# Patient Record
Sex: MALE | Race: ASIAN | Employment: OTHER | URBAN - METROPOLITAN AREA
[De-identification: names, ages, dates, MRNs, and addresses within clinical notes are randomized per-mention and may not be internally consistent; named-entity substitution may affect disease eponyms.]

---

## 2022-09-02 ENCOUNTER — HOSPITAL ENCOUNTER (EMERGENCY)
Age: 67
Discharge: HOME | End: 2022-09-02
Attending: EMERGENCY MEDICINE
Payer: MEDICARE

## 2022-09-02 VITALS
HEART RATE: 65 BPM | OXYGEN SATURATION: 95 % | HEIGHT: 65 IN | WEIGHT: 153 LBS | DIASTOLIC BLOOD PRESSURE: 66 MMHG | SYSTOLIC BLOOD PRESSURE: 134 MMHG | BODY MASS INDEX: 25.49 KG/M2 | RESPIRATION RATE: 16 BRPM | TEMPERATURE: 98.4 F

## 2022-09-02 DIAGNOSIS — U07.1 COVID-19 VIRUS INFECTION: Primary | ICD-10-CM

## 2022-09-02 PROCEDURE — 99283 EMERGENCY DEPT VISIT LOW MDM: CPT

## 2022-09-02 RX ORDER — LOVASTATIN 40 MG/1
40 TABLET ORAL
COMMUNITY

## 2022-09-02 RX ORDER — NIRMATRELVIR AND RITONAVIR 150-100 MG
2 KIT ORAL EVERY 12 HOURS
Qty: 1 BOX | Refills: 0 | Status: SHIPPED | OUTPATIENT
Start: 2022-09-02 | End: 2022-09-07

## 2022-09-02 RX ORDER — AMLODIPINE BESYLATE 5 MG/1
5 TABLET ORAL DAILY
COMMUNITY

## 2022-09-02 RX ORDER — ACETAMINOPHEN 325 MG/1
1000 TABLET ORAL
Qty: 20 TABLET | Refills: 0 | Status: SHIPPED | OUTPATIENT
Start: 2022-09-02

## 2022-09-02 RX ORDER — PANTOPRAZOLE SODIUM 40 MG/1
40 TABLET, DELAYED RELEASE ORAL DAILY
COMMUNITY

## 2022-09-02 RX ORDER — GUAIFENESIN 1200 MG/1
1200 TABLET, EXTENDED RELEASE ORAL 2 TIMES DAILY
Qty: 10 TABLET | Refills: 0 | Status: SHIPPED | OUTPATIENT
Start: 2022-09-02

## 2022-09-02 RX ORDER — LOSARTAN POTASSIUM AND HYDROCHLOROTHIAZIDE 12.5; 5 MG/1; MG/1
1 TABLET ORAL DAILY
COMMUNITY

## 2022-09-02 RX ORDER — METFORMIN HYDROCHLORIDE 500 MG/1
500 TABLET ORAL 2 TIMES DAILY WITH MEALS
COMMUNITY

## 2022-09-02 NOTE — ED PROVIDER NOTES
66M w/ hx DM, HTN, HLD p/w 2d cough and body aches. Pt reports 2d cough, body aches, fatigue, joint pain. Also reports subjective F/C, sore throat w/ congestion. No dizziness, dyspnea, syncope or N/V/D. Wife is sick w/ same symptoms. Pt is vaccinated for COVID19 x3. Wife tested positive for COVID today and is a pt in ED. No hx of smoking. Past Medical History:   Diagnosis Date    Diabetes ([facility] [address]     type 2    Hyperlipidemia     Hypertension        History reviewed. No pertinent surgical history. History reviewed. No pertinent family history. Social History     Socioeconomic History    Marital status:      Spouse name: Not on file    Number of children: Not on file    Years of education: Not on file    Highest education level: Not on file   Occupational History    Not on file   Tobacco Use    Smoking status: Never     Passive exposure: Never    Smokeless tobacco: Never   Vaping Use    Vaping Use: Never used   Substance and Sexual Activity    Alcohol use: Never    Drug use: Never    Sexual activity: Not on file   Other Topics Concern    Not on file   Social History Narrative    Not on file     Social Determinants of Health     Financial Resource Strain: Not on file   Food Insecurity: Not on file   Transportation Needs: Not on file   Physical Activity: Not on file   Stress: Not on file   Social Connections: Not on file   Intimate Partner Violence: Not on file   Housing Stability: Not on file         ALLERGIES: Patient has no known allergies. Review of Systems   Constitutional:  Positive for chills, fatigue and fever. Negative for diaphoresis. HENT:  Positive for congestion and rhinorrhea. Negative for facial swelling, mouth sores, nosebleeds, trouble swallowing and voice change. Eyes:  Negative for pain and visual disturbance. Respiratory:  Positive for cough. Negative for apnea, shortness of breath, wheezing and stridor.     Cardiovascular:  Negative for chest pain, palpitations and leg swelling. Gastrointestinal:  Negative for abdominal distention, abdominal pain, blood in stool, diarrhea, nausea and vomiting. Genitourinary:  Negative for difficulty urinating, dysuria, flank pain, hematuria, scrotal swelling and testicular pain. Musculoskeletal:  Positive for arthralgias and myalgias. Negative for joint swelling. Skin:  Negative for color change and rash. Allergic/Immunologic: Negative for immunocompromised state. Neurological:  Negative for dizziness, syncope and light-headedness. Hematological:  Does not bruise/bleed easily. Psychiatric/Behavioral:  Negative for agitation and behavioral problems. Vitals:    09/02/22 1440   BP: 134/66   Pulse: 65   Resp: 16   Temp: 98.4 °F (36.9 °C)   SpO2: 95%   Weight: 69.4 kg (153 lb)   Height: 5' 5\" (1.651 m)            Physical Exam  Vitals and nursing note reviewed. Constitutional:       General: He is not in acute distress. Appearance: Normal appearance. He is not ill-appearing or toxic-appearing. HENT:      Head: Normocephalic and atraumatic. Right Ear: External ear normal.      Left Ear: External ear normal.      Nose: Nose normal.      Mouth/Throat:      Mouth: Mucous membranes are moist.      Pharynx: Oropharynx is clear. No oropharyngeal exudate or posterior oropharyngeal erythema. Eyes:      General: No scleral icterus. Extraocular Movements: Extraocular movements intact. Conjunctiva/sclera: Conjunctivae normal.      Pupils: Pupils are equal, round, and reactive to light. Cardiovascular:      Rate and Rhythm: Normal rate and regular rhythm. Pulses: Normal pulses. Heart sounds: No murmur heard. No friction rub. No gallop. Pulmonary:      Effort: Pulmonary effort is normal. No respiratory distress. Breath sounds: Normal breath sounds. No stridor. No wheezing, rhonchi or rales. Abdominal:      General: Bowel sounds are normal. There is no distension. Palpations: Abdomen is soft. Tenderness: There is no abdominal tenderness. There is no guarding or rebound. Musculoskeletal:         General: No deformity. Normal range of motion. Cervical back: Normal range of motion and neck supple. No rigidity. Right lower leg: No edema. Left lower leg: No edema. Skin:     General: Skin is warm. Capillary Refill: Capillary refill takes less than 2 seconds. Coloration: Skin is not jaundiced. Neurological:      General: No focal deficit present. Mental Status: He is alert. Cranial Nerves: No cranial nerve deficit. Sensory: No sensory deficit. Motor: No weakness. Coordination: Coordination normal.   Psychiatric:         Mood and Affect: Mood normal.         Behavior: Behavior normal.         Thought Content: Thought content normal.         Judgment: Judgment normal.        MDM  Number of Diagnoses or Management Options  COVID-19 virus infection  Diagnosis management comments: 66M w/ hx DM, HTN, HLD p/w 2d cough and body aches. Pt well appearing, hemodynamically stable, afebrile, no resp distress, hypoxia or increased WOB. Wife tested positive for COVID+ today. Low concern for sepsis or serious bacterial infection. Pt declined any further testing including labs, cxr, ekg, etc. Agreed to receive paxlovid x5d and scripts for tylenol and mucinex. This patient was seen during the [place] 19 Pandemic. I suspect that the patient has COVID 19 infection of mild severity based on HPI and physical exam. They are in no need of supplemental oxygenation and do not require further inpatient evaluation/admission at this time. Patient is aware of return precautions which include severe shortness of breath, chest pain, high fevers or any worsening of symptoms, detailed instructions regarding s/s of COVID 19 instructions included in the dc papers as well.  Patient is aware that they must follow-up with the primary care physician ideally via a virtual visit or return to the emergency department only if his symptoms worsen. Isolation precautions and hand hygeine discussed at length. At the time of d/c patient is well appearing, not sickly, toxic, ambulatory without respiratory distress, hypotension or hypoxia.      Risk of Complications, Morbidity, and/or Mortality  Presenting problems: moderate  Diagnostic procedures: moderate  Management options: moderate           Procedures

## 2022-09-02 NOTE — ED NOTES
Pain assessment on discharge was   Condition Stable  Patient discharged to home  Patient education was completed  Education taught to patient  Teaching method used was handout and verbal  Understanding of teaching was good  Patient was discharged ambulatory  Discharged with family  Valuables were given to:  pt remained in possession of belongings

## 2022-09-02 NOTE — ED TRIAGE NOTES
Patient arrives with c/o fever, cough, generalized body aches, headache and known COVID positive since this morning.

## 2022-09-06 ENCOUNTER — PATIENT OUTREACH (OUTPATIENT)
Dept: CASE MANAGEMENT | Age: 67
End: 2022-09-06

## 2022-09-06 NOTE — PROGRESS NOTES
Patient contacted regarding COVID-19 exposure, diagnosis. Discussed COVID-19 related testing which was available at this time. Test results were positive. Patient informed of results, if available? Tested at home. LPN Care Coordinator contacted the patient by telephone to perform post discharge assessment. Call within 2 business days of discharge: Yes Verified name and  with patient as identifiers. Provided introduction to self, and explanation of the CTN/ACM role, and reason for call due to risk factors for infection and/or exposure to COVID-19. Symptoms reviewed with patient who verbalized the following symptoms: pain or aching joints and cough      Due to no new or worsening symptoms encounter was not routed to provider for escalation. Discussed follow-up appointments. If no appointment was previously scheduled, appointment scheduling offered:  no. [facility] follow up appointment(s): No future appointments. Non-[facility] follow up appointment(s): n/a    Interventions to address risk factors: Education of patient/family/caregiver/guardian to support self-management-VDH and covid numbers given     Advance Care Planning:   Does patient have an Advance Directive: not on file. Educated patient about risk for severe COVID-19 due to risk factors according to CDC guidelines. LPN CC reviewed discharge instructions, medical action plan and red flag symptoms with the patient who verbalized understanding. Discussed COVID vaccination status: yes. Education provided on COVID-19 vaccination as appropriate. Discussed exposure protocols and quarantine with CDC Guidelines. Patient was given an opportunity to verbalize any questions and concerns and agrees to contact LPN CC or health care provider for questions related to their healthcare. Reviewed and educated patient on any new and changed medications related to discharge diagnosis     Was patient discharged with a pulse oximeter?  no    LPN CC provided contact information. Plan for follow-up call in 5-7 days based on severity of symptoms and risk factors.

## 2022-09-13 ENCOUNTER — PATIENT OUTREACH (OUTPATIENT)
Dept: CASE MANAGEMENT | Age: 67
End: 2022-09-13

## 2022-09-13 NOTE — PROGRESS NOTES
Patient resolved from [address] Transitions episode on 09/13/22 . Discussed COVID-19 related testing which was not done at this time. Test results were not done. Patient informed of results, if available? Home test positive. Patient/family has been provided the following resources and education related to COVID-19:                         Signs, symptoms and red flags related to COVID-19            CDC exposure and quarantine guidelines            [facility] exposure contact - [phone]            Contact for their local Department of Health                 Patient currently reports that the following symptoms have improved:  no worsening symptoms. No further outreach scheduled with this CTN/ACM/LPN/HC/ MA. Episode of Care resolved. Patient has this CTN/ACM/LPN/HC/MA contact information if future needs arise.

## 2024-08-20 ENCOUNTER — ANESTHESIA (OUTPATIENT)
Facility: HOSPITAL | Age: 69
End: 2024-08-20
Payer: MEDICARE

## 2024-08-20 ENCOUNTER — ANESTHESIA EVENT (OUTPATIENT)
Facility: HOSPITAL | Age: 69
End: 2024-08-20
Payer: MEDICARE

## 2024-08-20 ENCOUNTER — HOSPITAL ENCOUNTER (OUTPATIENT)
Facility: HOSPITAL | Age: 69
Setting detail: OUTPATIENT SURGERY
Discharge: HOME | End: 2024-08-20
Attending: INTERNAL MEDICINE | Admitting: INTERNAL MEDICINE
Payer: MEDICARE

## 2024-08-20 VITALS
OXYGEN SATURATION: 99 % | RESPIRATION RATE: 13 BRPM | HEART RATE: 46 BPM | SYSTOLIC BLOOD PRESSURE: 120 MMHG | DIASTOLIC BLOOD PRESSURE: 60 MMHG | TEMPERATURE: 97.2 F

## 2024-08-20 PROCEDURE — 3600007512: Performed by: INTERNAL MEDICINE

## 2024-08-20 PROCEDURE — 6360000002 HC RX W HCPCS: Performed by: NURSE ANESTHETIST, CERTIFIED REGISTERED

## 2024-08-20 PROCEDURE — 2720000010 HC SURG SUPPLY STERILE: Performed by: INTERNAL MEDICINE

## 2024-08-20 PROCEDURE — 3700000000 HC ANESTHESIA ATTENDED CARE: Performed by: INTERNAL MEDICINE

## 2024-08-20 PROCEDURE — 2709999900 HC NON-CHARGEABLE SUPPLY: Performed by: INTERNAL MEDICINE

## 2024-08-20 PROCEDURE — 2500000003 HC RX 250 WO HCPCS: Performed by: NURSE ANESTHETIST, CERTIFIED REGISTERED

## 2024-08-20 PROCEDURE — 7100000011 HC PHASE II RECOVERY - ADDTL 15 MIN: Performed by: INTERNAL MEDICINE

## 2024-08-20 PROCEDURE — 7100000010 HC PHASE II RECOVERY - FIRST 15 MIN: Performed by: INTERNAL MEDICINE

## 2024-08-20 PROCEDURE — 88305 TISSUE EXAM BY PATHOLOGIST: CPT

## 2024-08-20 PROCEDURE — 3700000001 HC ADD 15 MINUTES (ANESTHESIA): Performed by: INTERNAL MEDICINE

## 2024-08-20 PROCEDURE — 3600007502: Performed by: INTERNAL MEDICINE

## 2024-08-20 PROCEDURE — 2580000003 HC RX 258: Performed by: NURSE ANESTHETIST, CERTIFIED REGISTERED

## 2024-08-20 RX ORDER — SODIUM CHLORIDE 0.9 % (FLUSH) 0.9 %
5-40 SYRINGE (ML) INJECTION EVERY 12 HOURS SCHEDULED
Status: DISCONTINUED | OUTPATIENT
Start: 2024-08-20 | End: 2024-08-20 | Stop reason: HOSPADM

## 2024-08-20 RX ORDER — SODIUM CHLORIDE 0.9 % (FLUSH) 0.9 %
5-40 SYRINGE (ML) INJECTION PRN
Status: DISCONTINUED | OUTPATIENT
Start: 2024-08-20 | End: 2024-08-20 | Stop reason: HOSPADM

## 2024-08-20 RX ORDER — SODIUM CHLORIDE 9 MG/ML
25 INJECTION, SOLUTION INTRAVENOUS PRN
Status: DISCONTINUED | OUTPATIENT
Start: 2024-08-20 | End: 2024-08-20 | Stop reason: HOSPADM

## 2024-08-20 RX ORDER — SODIUM CHLORIDE 9 MG/ML
INJECTION, SOLUTION INTRAVENOUS CONTINUOUS PRN
Status: DISCONTINUED | OUTPATIENT
Start: 2024-08-20 | End: 2024-08-20 | Stop reason: SDUPTHER

## 2024-08-20 RX ORDER — SODIUM CHLORIDE 9 MG/ML
INJECTION, SOLUTION INTRAVENOUS CONTINUOUS
Status: DISCONTINUED | OUTPATIENT
Start: 2024-08-20 | End: 2024-08-20 | Stop reason: HOSPADM

## 2024-08-20 RX ORDER — LIDOCAINE HYDROCHLORIDE 20 MG/ML
INJECTION, SOLUTION EPIDURAL; INFILTRATION; INTRACAUDAL; PERINEURAL PRN
Status: DISCONTINUED | OUTPATIENT
Start: 2024-08-20 | End: 2024-08-20 | Stop reason: SDUPTHER

## 2024-08-20 RX ADMIN — PROPOFOL 25 MG: 10 INJECTION, EMULSION INTRAVENOUS at 13:12

## 2024-08-20 RX ADMIN — LIDOCAINE HYDROCHLORIDE 50 MG: 20 INJECTION, SOLUTION EPIDURAL; INFILTRATION; INTRACAUDAL; PERINEURAL at 13:06

## 2024-08-20 RX ADMIN — PROPOFOL 50 MG: 10 INJECTION, EMULSION INTRAVENOUS at 13:17

## 2024-08-20 RX ADMIN — PROPOFOL 50 MG: 10 INJECTION, EMULSION INTRAVENOUS at 13:19

## 2024-08-20 RX ADMIN — PROPOFOL 50 MG: 10 INJECTION, EMULSION INTRAVENOUS at 13:22

## 2024-08-20 RX ADMIN — PROPOFOL 25 MG: 10 INJECTION, EMULSION INTRAVENOUS at 13:10

## 2024-08-20 RX ADMIN — PROPOFOL 50 MG: 10 INJECTION, EMULSION INTRAVENOUS at 13:14

## 2024-08-20 RX ADMIN — PROPOFOL 25 MG: 10 INJECTION, EMULSION INTRAVENOUS at 13:25

## 2024-08-20 RX ADMIN — SODIUM CHLORIDE: 9 INJECTION, SOLUTION INTRAVENOUS at 12:33

## 2024-08-20 RX ADMIN — PROPOFOL 75 MG: 10 INJECTION, EMULSION INTRAVENOUS at 13:06

## 2024-08-20 RX ADMIN — PROPOFOL 25 MG: 10 INJECTION, EMULSION INTRAVENOUS at 13:08

## 2024-08-20 NOTE — PROGRESS NOTES
Initial RN admission and assessment performed and documented in Endoscopy navigator.     Patient evaluated by anesthesia in pre-procedure holding.     All procedural vital signs, airway assessment, and level of consciousness information monitored and recorded by anesthesia staff on the anesthesia record.     Report received from CRNA post procedure.  Patient transported to recovery area by RN.    Endoscopy post procedure time out was performed and specimens were verified with physician.    Endoscope was pre-cleaned at bedside immediately following procedure by Jose Miguel ARMSTRONG

## 2024-08-20 NOTE — ANESTHESIA PRE PROCEDURE
Bong JUAREZ MD       • 0.9 % sodium chloride infusion  25 mL IntraVENous PRN Bong Boswell MD         Facility-Administered Medications Ordered in Other Encounters   Medication Dose Route Frequency Provider Last Rate Last Admin   • 0.9 % sodium chloride infusion   IntraVENous Continuous PRN Mela Salguero APRN - CRNA   New Bag at 08/20/24 1233       Allergies:  No Known Allergies    Problem List:  There is no problem list on file for this patient.      Past Medical History:        Diagnosis Date   • Diabetes (HCC)     type 2   • Hyperlipidemia    • Hypertension    • Urolithiasis        Past Surgical History:  History reviewed. No pertinent surgical history.    Social History:    Social History     Tobacco Use   • Smoking status: Never   • Smokeless tobacco: Never   Substance Use Topics   • Alcohol use: Never                                Counseling given: Not Answered      Vital Signs (Current):   Vitals:    08/20/24 1220   BP: (!) 155/66   Pulse: 55   Resp: 20   Temp: 97.6 °F (36.4 °C)   TempSrc: Tympanic   SpO2: 96%                                              BP Readings from Last 3 Encounters:   08/20/24 (!) 155/66   12/20/23 (!) 141/66       NPO Status:                                                                                 BMI:   Wt Readings from Last 3 Encounters:   12/20/23 68 kg (150 lb)     There is no height or weight on file to calculate BMI.    CBC:   Lab Results   Component Value Date/Time    WBC 5.2 12/20/2023 11:59 AM    RBC 4.86 12/20/2023 11:59 AM    HGB 14.3 12/20/2023 11:59 AM    HCT 42.2 12/20/2023 11:59 AM    MCV 86.8 12/20/2023 11:59 AM    RDW 12.1 12/20/2023 11:59 AM     12/20/2023 11:59 AM       CMP:   Lab Results   Component Value Date/Time     12/20/2023 11:59 AM    K 3.6 12/20/2023 11:59 AM     12/20/2023 11:59 AM    CO2 27 12/20/2023 11:59 AM    BUN 16 12/20/2023 11:59 AM    CREATININE 0.62 12/20/2023 11:59 AM    LABGLOM >60 12/20/2023 11:59 AM

## 2024-08-20 NOTE — ANESTHESIA POSTPROCEDURE EVALUATION
Post-Anesthesia Evaluation and Assessment    Patient: Luis Rocha MRN: [id number]  SSN: [id number]    YOB: 1955  Age: 68 y.o.  Sex: male      I have evaluated the patient and they are stable and ready for discharge from the PACU.     Cardiovascular Function/Vital Signs  Visit Vitals  /60   Pulse (!) 46   Temp 97.2 °F (36.2 °C) (Temporal)   Resp 13   SpO2 99%       Patient is status post Monitor Anesthesia Care anesthesia for Procedure(s):  ESOPHAGOGASTRODUODENOSCOPY  COLONOSCOPY.    Nausea/Vomiting: None    Postoperative hydration reviewed and adequate.    Pain:      Managed    Neurological Status:       At baseline    Mental Status, Level of Consciousness: Alert and  oriented to person, place, and time    Pulmonary Status:       Adequate oxygenation and airway patent    Complications related to anesthesia: None    Post-anesthesia assessment completed. No concerns    Signed By: Evens Montes De Oca MD     August 20, 2024            Post-Anesthesia Evaluation and Assessment    Patient: Luis Rocha MRN: [id number]  SSN: [id number]    YOB: 1955  Age: 68 y.o.  Sex: male      I have evaluated the patient and they are stable and ready for discharge from the PACU.     Cardiovascular Function/Vital Signs  Visit Vitals  /60   Pulse (!) 46   Temp 97.2 °F (36.2 °C) (Temporal)   Resp 13   SpO2 99%       Patient is status post Monitor Anesthesia Care anesthesia for Procedure(s):  ESOPHAGOGASTRODUODENOSCOPY  COLONOSCOPY.    Nausea/Vomiting: None    Postoperative hydration reviewed and adequate.    Pain:      Managed    Neurological Status:       At baseline    Mental Status, Level of Consciousness: Alert and  oriented to person, place, and time    Pulmonary Status:       Adequate oxygenation and airway patent    Complications related to anesthesia: None    Post-anesthesia assessment completed. No concerns    Signed By: Evens Montes De Oca MD     August 20, 2024            Department of

## 2024-08-20 NOTE — DISCHARGE INSTRUCTIONS
[facility] [address] [address]    EGD DISCHARGE INSTRUCTIONS    Luis Rocha  [id number]  1955    Discomfort:  Sore throat- throat lozenges or warm salt water gargle  redness at IV site- apply warm compress to area; if redness or soreness persist- contact your physician  Gaseous discomfort- walking, belching will help relieve any discomfort  You may not operate a vehicle for 12 hours  You may not engage in an occupation involving machinery or appliances for rest of today  You may not drink alcoholic beverages for at least 12 hours  Avoid making any critical decisions for at least 24 hour  DIET  You may resume your regular diet - however -  remember your colon is empty and a heavy meal will produce gas.   Avoid these foods:  vegetables, fried / greasy foods, carbonated drinks    ACTIVITY  You may resume your normal daily activities   Spend the remainder of the day resting -  avoid any strenuous activity.    CALL M.D.  ANY SIGN OF   Increasing pain, nausea, vomiting  Abdominal distension (swelling)  New increased bleeding (oral or rectal)  Fever (chills)  Pain in chest area  Bloody discharge from nose or mouth  Shortness of breath    Follow-up Instructions:   Call Dr. Bong Boswell for any questions or problems.  Telephone # [phone]    ENDOSCOPY FINDINGS:   Your endoscopy showed gastritis, for which biopsies were taken. We will contact you about the pathology results. Please avoid NSAID's.    Signed By: Bong Boswell MD     8/20/2024  1:38 PM

## 2024-08-20 NOTE — H&P
0.9 % sodium chloride infusion  25 mL IntraVENous PRN     Facility-Administered Medications Ordered in Other Encounters   Medication Dose Route Frequency    0.9 % sodium chloride infusion   IntraVENous Continuous PRN       Social History:  Social History     Tobacco Use    Smoking status: Never    Smokeless tobacco: Never   Substance Use Topics    Alcohol use: Never       Family History:  History reviewed. No pertinent family history.          Review of Systems:      Constitutional: negative fever, negative chills, negative weight loss  Eyes:   negative visual changes  ENT:   negative sore throat, tongue or lip swelling  Respiratory:  negative cough, negative dyspnea  Cards:  negative for chest pain, palpitations, lower extremity edema  GI:   See HPI  :  negative for frequency, dysuria  Integument:  negative for rash and pruritus  Heme:  negative for easy bruising and gum/nose bleeding  Musculoskel: negative for myalgias,  back pain and muscle weakness  Neuro: negative for headaches, dizziness, vertigo  Psych:  negative for feelings of anxiety, depression       Objective:   Patient Vitals for the past 8 hrs:   BP Temp Temp src Pulse Resp SpO2   08/20/24 1220 (!) 155/66 97.6 °F (36.4 °C) Tympanic 55 20 96 %     No intake/output data recorded.  No intake/output data recorded.    EXAM:     NEURO-a&o   HEENT-wnl   LUNGS-clear    COR-regular rate and rhythym     ABD-soft , no tenderness, no rebound, good bs     EXT-no edema     Data Review     No results for input(s): \"WBC\", \"HGB\", \"HCT\", \"PLT\" in the last 72 hours.  No results for input(s): \"NA\", \"K\", \"CL\", \"CO2\", \"BUN\", \"GLU\", \"PHOS\" in the last 72 hours.    Invalid input(s): \"CREA\", \"CA\"  No results for input(s): \"TP\", \"GLOB\", \"GGT\" in the last 72 hours.    Invalid input(s): \"SGOT\", \"GPT\", \"AP\", \"TBIL\", \"ALB\", \"AML\", \"AMYP\", \"LPSE\", \"HLPSE\"  No results for input(s): \"INR\", \"APTT\" in the last 72 hours.    Invalid input(s): \"PTP\"       Assessment:     GERD  Colon

## 2024-08-20 NOTE — OP NOTE
[facility]  [address] [place], [place] [address]          [facility]ago- Gastroduodenoscopy (EGD) Procedure Note    Son Martin Rocha  1955  [id number]      Procedure: Endoscopic Gastroduodenoscopy --diagnostic, with biopsy    Indication: GERD    Pre-operative Diagnosis: see indication above    Post-operative Diagnosis: see findings below    : Bong Boswell MD    Staff: Circulator: Law Rosario RN  Endoscopy Technician: Jose Miguel Pacheco     Referring Provider:  Mundo Armijo Jr., MD      Anesthesia/Sedation: MAC        Procedure Details     After informed consent was obtained for the procedure, with all risks and benefits of procedure explained the patient was taken to the endoscopy suite and placed in the left lateral decubitus position.  Following sequential administration of sedation as per above, the endoscope was inserted into the mouth and advanced under direct vision to second portion of the duodenum. A careful inspection was made as the gastroscope was withdrawn, including a retroflexed view of the proximal stomach; findings and interventions are described below.      Findings:   Esophagus: normal  Stomach: patch antral erythema - cold biopsies taken  Duodenum: normal to second portion    Specimens: 1. gastric         EBL: None      Complications:   None; patient tolerated the procedure well.           Impression:    As above    Recommendations:  Follow up surgical pathology  Avoid NSAID's  Proceed to colonoscopy    Signed By: Bong Boswell MD     8/20/2024  1:40 PM

## 2024-08-20 NOTE — OP NOTE
[facility] [address] [place], [place] [address]        Colonoscopy Operative Report    Son Martin Rocha  [id number]  1955      Procedure Type:   Colonoscopy - screening    Indications:  colon cancer screening    Pre-operative Diagnosis: see indication above    Post-operative Diagnosis:  See findings below    :  Bong Boswell MD    Staff: Circulator: Law Rosario RN  Endoscopy Technician: Jose Miguel Pacheco     Referring Provider: Mundo Armijo Jr., MD      Sedation:  MAC      Procedure Details:  After informed consent was obtained with all risks and benefits of procedure explained and preoperative exam completed, the patient was taken to the endoscopy suite and placed in the left lateral decubitus position.  Upon sequential sedation as per above, a digital rectal exam was performed demonstrating internal hemorrhoids.  The Olympus pediatric videocolonoscope  was inserted in the rectum and carefully advanced to the terminal ileum.  The cecum was identified by the ileocecal valve and appendiceal orifice.  The quality of preparation was good.  The colonoscope was slowly withdrawn with careful evaluation between folds. Retroflexion in the rectum was completed .     Findings:   Normal colon and terminal ileum.      Specimen Removed:  none    Complications: None.     EBL:  None.    Impression:     As above    Recommendations:  High fiber diet education  Repeat colonoscopy in 10 years    Signed By: Bong Boswell MD     8/20/2024  1:40 PM

## (undated) DEVICE — FORCEPS BX L240CM JAW DIA2.4MM WRK CHN 2.8MM ORNG L CAP W/

## (undated) DEVICE — SUPPLEMENT DIGESTIVE H2O SOL GI-EASE

## (undated) DEVICE — ORISE PROKNIFE 1.5 MM ELECTRODE: Brand: ORISE™ PROKNIFE

## (undated) DEVICE — ORISE PROKNIFE 3.0 MM ELECTRODE: Brand: ORISE™ PROKNIFE